# Patient Record
(demographics unavailable — no encounter records)

---

## 2024-10-14 NOTE — CARDIOLOGY SUMMARY
[de-identified] : Atrial fibrillation, rapid ventricular response [de-identified] : 1/2016- 24 hr holter -SVT/PAT, ?a fib 5/2016- 24 hr, SVT/PAT,  [de-identified] : hospitalized: 6/2024 CONCLUSIONS:  1. Left ventricular cavity is small. Left ventricular wall thickness is normal. Left ventricular systolic function is normal with an ejection fraction of 68 % by Torres's method of disks. There are no regional wall motion abnormalities seen.  2. Left ventricular global longitudinal strain is -20.1 % which is normal (< -18%). Images were acquired on a [employer] ultrasound system and processed using [employer] strain analysis software with a heart rate of 64 bpm and a blood pressure of 132/74 mmHg.  3. The left ventricular diastolic function is indeterminate, with elevated filling pressure.  4. Normal right ventricular cavity size, with normal wall thickness, and normal systolic function.  5. The left atrium is normal in size.  6. The right atrium is mildly dilated.  7. No pericardial effusion seen.  8. Estimated pulmonary artery systolic pressure is 37 mmHg.  9. Compared to the transthoracic echocardiogram performed on 4/12/2022, there have been no significant interval changes.

## 2024-10-14 NOTE — REASON FOR VISIT
[Arrhythmia/ECG Abnorrmalities] : arrhythmia/ECG abnormalities [Other: ____] : [unfilled] [Follow-Up - Clinic] : a clinic follow-up of [Chest Pain] : chest pain

## 2024-10-14 NOTE — PHYSICAL EXAM
[General Appearance - Well Developed] : well developed [Normal Appearance] : normal appearance [Well Groomed] : well groomed [General Appearance - Well Nourished] : well nourished [No Deformities] : no deformities [General Appearance - In No Acute Distress] : no acute distress [Normal Conjunctiva] : the conjunctiva exhibited no abnormalities [Eyelids - No Xanthelasma] : the eyelids demonstrated no xanthelasmas [Normal Oral Mucosa] : normal oral mucosa [No Oral Pallor] : no oral pallor [No Oral Cyanosis] : no oral cyanosis [Normal Jugular Venous A Waves Present] : normal jugular venous A waves present [Normal Jugular Venous V Waves Present] : normal jugular venous V waves present [No Jugular Venous Martins A Waves] : no jugular venous martins A waves [Respiration, Rhythm And Depth] : normal respiratory rhythm and effort [Exaggerated Use Of Accessory Muscles For Inspiration] : no accessory muscle use [Auscultation Breath Sounds / Voice Sounds] : lungs were clear to auscultation bilaterally [Abdomen Soft] : soft [Abdomen Tenderness] : non-tender [Abdomen Mass (___ Cm)] : no abdominal mass palpated [Nail Clubbing] : no clubbing of the fingernails [Cyanosis, Localized] : no localized cyanosis [Petechial Hemorrhages (___cm)] : no petechial hemorrhages [Skin Color & Pigmentation] : normal skin color and pigmentation [] : no rash [No Venous Stasis] : no venous stasis [Skin Lesions] : no skin lesions [No Skin Ulcers] : no skin ulcer [No Xanthoma] : no  xanthoma was observed [Oriented To Time, Place, And Person] : oriented to person, place, and time [Affect] : the affect was normal [Mood] : the mood was normal [No Anxiety] : not feeling anxious [Normal Rate] : normal [No Gallop] : no gallop heard [2+] : left 2+ [___ +] : bilateral [unfilled]U+ pretibial pitting edema [No Acute Distress] : no acute distress [No Carotid Bruit] : no carotid bruit [Rhythm Regular] : regular [Normal S1] : normal S1 [Normal S2] : normal S2 [No Murmur] : no murmurs heard [No Pitting Edema] : no pitting edema present [Rt] : varicose veins of the right leg noted [Lt] : varicose veins of the left leg noted [No Abnormalities] : the abdominal aorta was not enlarged and no bruit was heard [Clear Lung Fields] : clear lung fields [Good Air Entry] : good air entry [Soft] : abdomen soft [Non Tender] : non-tender [No Edema] : no edema [Moves all extremities] : moves all extremities [Memory Deficit] : memory deficit [FreeTextEntry1] : walks w walker [S3] : no S3 [S4] : no S4 [Right Femoral Bruit] : no bruit heard over the right femoral artery [Left Femoral Bruit] : no bruit heard over the left femoral artery [Right Carotid Bruit] : no bruit heard over the right carotid [Left Carotid Bruit] : no bruit heard over the left carotid [de-identified] : wheel chair primarily [de-identified] : 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

## 2024-10-14 NOTE — REVIEW OF SYSTEMS
[Speech Disturbance] : speech disturbance [Negative] : Heme/Lymph [de-identified] : see HPI [de-identified] : forgetful-short term memory

## 2024-10-14 NOTE — DISCUSSION/SUMMARY
[FreeTextEntry1] : Ms. Mendosa arrives today for follow up today having been feeling generally well.   She was discovered to have multiple strokes, and has more recently been discovered to have had paroxysmal rapid atrial fibrillation.  She is now anticoagulated.  She is minimally symptomatic from atrial fibrillation.  She certainly requires better rate control.  In sinus rhythm, her heart rate is about 60.  This may be difficult, can I suspect that the addition of a calcium channel blocker will be required, rather than increasing her dose of metoprolol.  I am hopeful that she will not require a permanent pacemaker.   Echocardiography performed during her recent hospitalization demonstrated a normal ejection fraction with normal biatrial size.  An extended Holter monitor was recently performed revealing up to 3 hours of atrial fibrillation.  She is going directly to the emergency department of [facility].  I explained that unless she reverts back to sinus rhythm quickly, she would likely be admitted.  She will require a follow-up evaluation in about a month. [EKG obtained to assist in diagnosis and management of assessed problem(s)] : EKG obtained to assist in diagnosis and management of assessed problem(s)

## 2024-10-14 NOTE — HISTORY OF PRESENT ILLNESS
[FreeTextEntry1] : Mallory presented to the office today for a cardiovascular evaluation.  She was last seen in the office 6 weeks ago.   She is now 88 years old, with a history of paroxysmal SVT, likely atrial tachycardia, as well as mild to moderate valvular heart disease.  There were initially some concerns in the past about the possibility of atrial fibrillation, but we did note episodes consistent with PAT when she was wearing a monitor.  She has done very well on Toprol-XL 50 mg daily.     Noted to have sciatica and sacroiliitis of b/l hip joints for which patient is on Duloxetine. She is sedentary and uses a walker or a wheelchair, though she has been trying to walk more, and having a modest degree of success.  She went to hospital for special surgery and had a comprehensive evaluation, and most recently has been using acupuncture.   She was evaluated in August, 2024, after having had a hospitalization at [facility] between June 19 and June 22, 2024.  She presented at that time with slurred speech and a right hemiparesis.  CT of the head revealed a right inferior cerebellar hemispheric abnormality felt to be an age-indeterminate infarct.  MRI revealed a region of distracted diffusion in the right superior and inferior cerebellar hemispheres corresponding to the decreased density on the CT scan, consistent with acute infarction.  There was an additional region in the parasagittal left anterior parietal lobe suggesting the presence of a tiny acute infarction.  The findings were suggestive of an embolic phenomenon.  Other abnormalities were noted as well.  An echocardiogram revealed a normal ejection fraction.  She was discharged to rehabilitation, and remained there for several weeks.  She saw me in the office at which time she was slowly improving.  It was noted that she had elevated liver function test, for which her statin had been held. Ultimately, we referred her for an extended Holter.  This revealed that she did have atrial fibrillation lasting up to 3 hours.  I requested that she stop aspirin, and start Eliquis, 2.5 mg twice daily.  She presents to the office today doing a bit better.  She remains with some significant limitations following her stroke. She did not have any sense of any irregularity in her heart rhythm until she was in the car on the way over this afternoon.

## 2024-12-23 NOTE — DISCUSSION/SUMMARY
[FreeTextEntry1] : Her gait was not tested, as she did not bring her walker. She is otherwise essentially neurologically intact.  To summarize, she presented to [facility] on 6/19/24 with aphasia due to an acute infarct in the left parietal cortex. She also had an acute right cerebellar infarct at the time, which was clinically silent. The mechanism of her infarcts is consistent with embolic stroke of undetermined source.  - She should have an implantable loop recorder to screen for occult AF. She is wearing an MCOT monitor; if negative, she will have an ILR. - She should benefit from aggressive vascular risk factor control. - She is taking Aspirin for secondary stroke prevention. - I have provided her with referrals for PT/OT/speech therapy/cognitive therapy.  12/23/24 -Overall she is neurologically stable. -Dopplers done today were unremarkable, with mild ICA stenosis bilaterally.  A complex left thyroid nodule was incidentally detected; I defer to you regarding whether this finding warrants further investigation. -Since her last visit, she was diagnosed with atrial fibrillation.  Her stroke in August was likely due to cardioembolism secondary to atrial fibrillation.  She should continue to take Eliquis for atrial fibrillation and secondary stroke prevention. -She should continue to benefit from aggressive vascular risk factor control. -She should continue to participate in regular PT/OT/speech therapy.  She can follow up in 6 months with repeat Dopplers. I hope she remains free of further serious trouble.

## 2024-12-23 NOTE — CONSULT LETTER
[Dear  ___] : Dear  [unfilled], [Consult Letter:] : I had the pleasure of evaluating your patient, [unfilled]. [Please see my note below.] : Please see my note below. [Consult Closing:] : Thank you very much for allowing me to participate in the care of this patient.  If you have any questions, please do not hesitate to contact me. [Sincerely,] : Sincerely, [FreeTextEntry2] :  Jose Carlos Jessica MD [FreeTextEntry3] : Ruth Beach, FNP-BC

## 2024-12-23 NOTE — HISTORY OF PRESENT ILLNESS
[FreeTextEntry1] : She is an 89 year old right handed lady.  HPI from [facility] 6/19/24:  88y (11-Nov-1935) F w/ PMHx significant for paroxysmal SVT, "fluid in the brain", b/l hip OA & b/l sciatica presents to the ED due to word finding difficulty. Patient was speaking on the phone at home when her home aid noted increased paucity with strange speech. Patient also noted a strange feeling in the right side of her face and RUE at the same time. The home aid states the symptoms waxed and waned in the day and the patient refused to come to the hospital. Patient went to sleep and woke this morning with improvement but not resolution of her symptoms. The sensory symptoms had resolved though word finding difficulty persists. Patient was still concerned about her speech and her family convinced her to come to the ED. CT head was without hemorrhage, mass effect or midline shift though revealed an area of hypoattenuation within the right inferior cerebellar hemisphere which may represent an age indeterminate infarct. CTA was without flow limiting stenosis, AVM or aneurysm.  Of note, patient has had chronic gait issues for the past 4 or so years. Patient's home aid states she has been using a walker for 4 or so years. This is reportedly related to sciatica and sacroiliitis of b/l hip joints for which patient is on Duloxetine. In addition, patient's daughter reports the patient has a history of "fluid in the brain" for which she was evaluated outpatient about 1.5 years ago and it was determined that there was no role for operative intervention.   Patient continues to express difficulty finding the right words. Patient denies headache, nausea, vomiting, dizziness, slurred speech, vision changes, hearing changes, focal numbness or focal weakness.  Workup at [facility] includes: - CTA H/N 6/19/24:  No large vessel occlusion or major stenosis. - MRI HEAD 06/20/24: Region of restricted diffusion within the right superior and inferior  cerebellar hemispheres corresponding to decreased density on the recent  brain CT consistent with acute infarction. Additional tiny linear region of restricted diffusion parasagittal left  anterior parietal lobe suggesting the presence of tiny acute infarction. Findings suggest the presence of embolic phenomenon. Findings suggesting the presence of mild to moderate normal pressure hydrocephalus.  TTE: Left ventricular cavity is small. Left ventricular wall thickness is normal. Left ventricular systolic function is normal with an ejection fraction of 68 %, The left atrium is normal in size.The right atrium is mildly dilated.  9/3/24 She presents to the office today with her daughter and home health attendant. She has made improvements since the stroke, but has residual difficulty with articulating thoughts and short term memory. She denies any new focal neurologic symptoms.  12/23/24 She presents to the office today with her daughter and home health attendant.  She still has some occasional dizziness and difficulty with articulation.  Since prior visit, she was diagnosed with atrial fibrillation and is now taking Eliquis.  She denies any new focal neurologic symptoms.  Carotid Doppler 12/23/2024: Mild, approximately 45% ICA stenosis bilaterally.  Incidental finding: Complex left thyroid nodule: 0.7 X 0.5 cm. TCD 12/23/2024.  Unable to insonate the left temporal window, due to poor acoustic windows.  The remainder of the [place] was within normal limits.  PCP Dr. Jose Carlos Jessica

## 2024-12-23 NOTE — PHYSICAL EXAM
[General Appearance - Alert] : alert [General Appearance - In No Acute Distress] : in no acute distress [Oriented To Time, Place, And Person] : oriented to person, place, and time [Impaired Insight] : insight and judgment were intact [Affect] : the affect was normal [Sclera] : the sclera and conjunctiva were normal [Extraocular Movements] : extraocular movements were intact [Outer Ear] : the ears and nose were normal in appearance [Examination Of The Oral Cavity] : the lips and gums were normal [Neck Appearance] : the appearance of the neck was normal [Auscultation Breath Sounds / Voice Sounds] : lungs were clear to auscultation bilaterally [Heart Sounds] : normal S1 and S2 [Motor Tone] : muscle strength and tone were normal [Skin Color & Pigmentation] : normal skin color and pigmentation [Skin Turgor] : normal skin turgor [] : no rash [FreeTextEntry1] :  NEUROLOGICAL EXAM: Mental status: Awake, alert, oriented x3. Speech fluent, repetition intact. No neglect, normal memory. Following commands. Some hesitancy with speech. Cranial Nerves: PERRL, EOMI. No facial asymmetry, no nystagmus, no dysarthria,  tongue midline Motor exam: Normal tone, no drift, 5/5 RUE, 5/5 RLE, 5/5 LUE, 5/5 LLE, fine finger movements are intact Sensation: Intact to light touch  Coordination/ Gait: No dysmetria, gait not assessed as she did not bring her walker

## 2025-01-13 NOTE — REASON FOR VISIT
[Arrhythmia/ECG Abnorrmalities] : arrhythmia/ECG abnormalities [Follow-Up - Clinic] : a clinic follow-up of [Chest Pain] : chest pain [Other: ____] : [unfilled]

## 2025-01-16 NOTE — CARDIOLOGY SUMMARY
[de-identified] : 1/13/25: Sinus onur  12/14/24: Atrial fibrillation, rapid ventricular response [de-identified] : 1/2016- 24 hr holter -SVT/PAT, ?a fib 5/2016- 24 hr, SVT/PAT,  [de-identified] : hospitalized: 6/2024 CONCLUSIONS:  1. Left ventricular cavity is small. Left ventricular wall thickness is normal. Left ventricular systolic function is normal with an ejection fraction of 68 % by Torres's method of disks. There are no regional wall motion abnormalities seen.  2. Left ventricular global longitudinal strain is -20.1 % which is normal (< -18%). Images were acquired on a [employer] ultrasound system and processed using [employer] strain analysis software with a heart rate of 64 bpm and a blood pressure of 132/74 mmHg.  3. The left ventricular diastolic function is indeterminate, with elevated filling pressure.  4. Normal right ventricular cavity size, with normal wall thickness, and normal systolic function.  5. The left atrium is normal in size.  6. The right atrium is mildly dilated.  7. No pericardial effusion seen.  8. Estimated pulmonary artery systolic pressure is 37 mmHg.  9. Compared to the transthoracic echocardiogram performed on 4/12/2022, there have been no significant interval changes.

## 2025-01-16 NOTE — PHYSICAL EXAM
[General Appearance - Well Developed] : well developed [Normal Appearance] : normal appearance [Well Groomed] : well groomed [General Appearance - Well Nourished] : well nourished [No Deformities] : no deformities [General Appearance - In No Acute Distress] : no acute distress [Eyelids - No Xanthelasma] : the eyelids demonstrated no xanthelasmas [Normal Oral Mucosa] : normal oral mucosa [No Oral Pallor] : no oral pallor [No Oral Cyanosis] : no oral cyanosis [Normal Jugular Venous A Waves Present] : normal jugular venous A waves present [Normal Jugular Venous V Waves Present] : normal jugular venous V waves present [No Jugular Venous Martins A Waves] : no jugular venous martins A waves [Respiration, Rhythm And Depth] : normal respiratory rhythm and effort [Exaggerated Use Of Accessory Muscles For Inspiration] : no accessory muscle use [Auscultation Breath Sounds / Voice Sounds] : lungs were clear to auscultation bilaterally [Abdomen Soft] : soft [Abdomen Tenderness] : non-tender [Abdomen Mass (___ Cm)] : no abdominal mass palpated [Nail Clubbing] : no clubbing of the fingernails [Cyanosis, Localized] : no localized cyanosis [Petechial Hemorrhages (___cm)] : no petechial hemorrhages [Skin Color & Pigmentation] : normal skin color and pigmentation [] : no rash [No Venous Stasis] : no venous stasis [Skin Lesions] : no skin lesions [No Skin Ulcers] : no skin ulcer [No Xanthoma] : no  xanthoma was observed [Oriented To Time, Place, And Person] : oriented to person, place, and time [Affect] : the affect was normal [Mood] : the mood was normal [No Anxiety] : not feeling anxious [Normal Rate] : normal [2+] : left 2+ [___ +] : bilateral [unfilled]U+ pretibial pitting edema [No Acute Distress] : no acute distress [No Carotid Bruit] : no carotid bruit [Rhythm Regular] : regular [Normal S1] : normal S1 [Normal S2] : normal S2 [No Pitting Edema] : no pitting edema present [Rt] : varicose veins of the right leg noted [Lt] : varicose veins of the left leg noted [No Abnormalities] : the abdominal aorta was not enlarged and no bruit was heard [Clear Lung Fields] : clear lung fields [Good Air Entry] : good air entry [Soft] : abdomen soft [Non Tender] : non-tender [No Edema] : no edema [Moves all extremities] : moves all extremities [Memory Deficit] : memory deficit [Well Developed] : well developed [Well Nourished] : well nourished [Normal Conjunctiva] : normal conjunctiva [Normal Venous Pressure] : normal venous pressure [Normal S1, S2] : normal S1, S2 [No Murmur] : no murmur [No Rub] : no rub [No Gallop] : no gallop [Bradycardia] : bradycardic [No Respiratory Distress] : no respiratory distress  [No Masses/organomegaly] : no masses/organomegaly [Normal Gait] : normal gait [FreeTextEntry1] : walks w walker [S3] : no S3 [S4] : no S4 [Left Femoral Bruit] : no bruit heard over the left femoral artery [Right Femoral Bruit] : no bruit heard over the right femoral artery [Right Carotid Bruit] : no bruit heard over the right carotid [Left Carotid Bruit] : no bruit heard over the left carotid [de-identified] : wheel chair primarily [de-identified] : 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

## 2025-01-16 NOTE — PHYSICAL EXAM
[General Appearance - Well Developed] : well developed [Normal Appearance] : normal appearance [Well Groomed] : well groomed [General Appearance - Well Nourished] : well nourished [No Deformities] : no deformities [General Appearance - In No Acute Distress] : no acute distress [Eyelids - No Xanthelasma] : the eyelids demonstrated no xanthelasmas [Normal Oral Mucosa] : normal oral mucosa [No Oral Pallor] : no oral pallor [No Oral Cyanosis] : no oral cyanosis [Normal Jugular Venous A Waves Present] : normal jugular venous A waves present [Normal Jugular Venous V Waves Present] : normal jugular venous V waves present [No Jugular Venous Martins A Waves] : no jugular venous martins A waves [Respiration, Rhythm And Depth] : normal respiratory rhythm and effort [Exaggerated Use Of Accessory Muscles For Inspiration] : no accessory muscle use [Auscultation Breath Sounds / Voice Sounds] : lungs were clear to auscultation bilaterally [Abdomen Soft] : soft [Abdomen Tenderness] : non-tender [Abdomen Mass (___ Cm)] : no abdominal mass palpated [Nail Clubbing] : no clubbing of the fingernails [Cyanosis, Localized] : no localized cyanosis [Petechial Hemorrhages (___cm)] : no petechial hemorrhages [Skin Color & Pigmentation] : normal skin color and pigmentation [] : no rash [No Venous Stasis] : no venous stasis [Skin Lesions] : no skin lesions [No Skin Ulcers] : no skin ulcer [No Xanthoma] : no  xanthoma was observed [Oriented To Time, Place, And Person] : oriented to person, place, and time [Affect] : the affect was normal [Mood] : the mood was normal [No Anxiety] : not feeling anxious [Normal Rate] : normal [2+] : left 2+ [___ +] : bilateral [unfilled]U+ pretibial pitting edema [No Acute Distress] : no acute distress [No Carotid Bruit] : no carotid bruit [Rhythm Regular] : regular [Normal S1] : normal S1 [Normal S2] : normal S2 [No Pitting Edema] : no pitting edema present [Rt] : varicose veins of the right leg noted [Lt] : varicose veins of the left leg noted [No Abnormalities] : the abdominal aorta was not enlarged and no bruit was heard [Clear Lung Fields] : clear lung fields [Good Air Entry] : good air entry [Soft] : abdomen soft [Non Tender] : non-tender [No Edema] : no edema [Moves all extremities] : moves all extremities [Memory Deficit] : memory deficit [Well Developed] : well developed [Well Nourished] : well nourished [Normal Conjunctiva] : normal conjunctiva [Normal Venous Pressure] : normal venous pressure [Normal S1, S2] : normal S1, S2 [No Murmur] : no murmur [No Rub] : no rub [No Gallop] : no gallop [Bradycardia] : bradycardic [No Respiratory Distress] : no respiratory distress  [No Masses/organomegaly] : no masses/organomegaly [Normal Gait] : normal gait [FreeTextEntry1] : walks w walker [S3] : no S3 [S4] : no S4 [Right Femoral Bruit] : no bruit heard over the right femoral artery [Left Femoral Bruit] : no bruit heard over the left femoral artery [Right Carotid Bruit] : no bruit heard over the right carotid [Left Carotid Bruit] : no bruit heard over the left carotid [de-identified] : wheel chair primarily [de-identified] : 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

## 2025-01-16 NOTE — REVIEW OF SYSTEMS
[Speech Disturbance] : speech disturbance [Negative] : Psychiatric [Dizziness] : no dizziness [Tremor] : no tremor was seen [Numbness (Hypoesthesia)] : no numbness [Convulsions] : no convulsions [Tingling (Paresthesia)] : no tingling [Weakness] : no weakness [Limb Weakness (Paresis)] : no limb weakness (Paresis) [de-identified] : see HPI [de-identified] : forgetful-short term memory

## 2025-01-16 NOTE — ADDENDUM
[FreeTextEntry1] : seen in oct 2024 with rapid af admitted for management, loaded with amio and dc the next day metop was decreased has remained on toprol 50 and amio 200, and eliquis, since doing well, in sr remains with some neuro sxs, stable can decr amio to 100

## 2025-01-16 NOTE — HISTORY OF PRESENT ILLNESS
[FreeTextEntry1] : Mrs. Mallory Mendosa presented to the office today for a cardiovascular evaluation.  She was last seen in the office 3 months ago.   She is now 89 years old, with a history of paroxysmal SVT, likely atrial tachycardia, as well as mild to moderate valvular heart disease and paroxysmal atrial fibrillation currently on Eliquis 2.5mg BID.  In the past, there were initially some concerns in the past about the possibility of atrial fibrillation, but did note episodes consistent with PAT when she was wearing a monitor in 9/2024.  She has done very well on Toprol-XL 50 mg daily.     Noted to have sciatica and sacroiliitis of b/l hip joints for which patient is on Duloxetine. She is sedentary and uses a walker or a wheelchair, though she has been trying to walk more, and having a modest degree of success.  She went to hospital for special surgery and had a comprehensive evaluation, and most recently has been using acupuncture.   She was evaluated in August, 2024, after having had a hospitalization at [facility] between June 19 and June 22, 2024.  She presented at that time with slurred speech and a right hemiparesis.  CT of the head revealed a right inferior cerebellar hemispheric abnormality felt to be an age-indeterminate infarct.  MRI revealed a region of distracted diffusion in the right superior and inferior cerebellar hemispheres corresponding to the decreased density on the CT scan, consistent with acute infarction.  There was an additional region in the parasagittal left anterior parietal lobe suggesting the presence of a tiny acute infarction.  The findings were suggestive of an embolic phenomenon.  Other abnormalities were noted as well.  An echocardiogram revealed a normal ejection fraction.  She was discharged to rehabilitation, and remained there for several weeks.  She saw me in the office at which time she was slowly improving.  It was noted that she had elevated liver function test, for which her statin had been held. Ultimately, we referred her for an extended Holter.  This revealed that she did have atrial fibrillation lasting up to 3 hours.  Aspirin 81mg was discontinued and she was started on Eliquis 2.5mg twice daily.    She presents to the office today with her daughter Jeff Tee.  Since her last office visit on 10/14/2024, she presented to the office in AFib with RVR.  She was advised to go to hospital.  She did not have any sense of any irregularity in her heart rhythm until she was in the car on the way over this afternoon.  She  presented to [facility] on 10/14/24 in AFib with RVR.  She was admitted and started on Amiodarone by Cardiology with improvement. She was previously on Metoprolol Succinate 100mg daily, she was discharged on Amiodarone 200mg 2 tabs PO BID for 5 days and then decreased to 200mg daily, Metoprolol Succinate 50mg daily and Eliquis 5mg BID. She is doing better from a cardiovascular stand point.  She remains with some significant limitations following her stroke. She denies any chest pain, chest pressure, palpitations, PND, orthopnea, lower extremity edema, near syncope, syncope or stroke like symptoms.    Medication reconciliation was performed.  Laureen daughter Kelly is uncertain of her mother's Metoprolol Succinate dosing.  She will call the office to confirm the dosing once she checks the bottle.

## 2025-01-16 NOTE — HISTORY OF PRESENT ILLNESS
[FreeTextEntry1] : Mrs. Mallory Mendosa presented to the office today for a cardiovascular evaluation.  She was last seen in the office 3 months ago.   She is now 89 years old, with a history of paroxysmal SVT, likely atrial tachycardia, as well as mild to moderate valvular heart disease and paroxysmal atrial fibrillation currently on Eliquis 2.5mg BID.  In the past, there were initially some concerns in the past about the possibility of atrial fibrillation, but did note episodes consistent with PAT when she was wearing a monitor in 9/2024.  She has done very well on Toprol-XL 50 mg daily.     Noted to have sciatica and sacroiliitis of b/l hip joints for which patient is on Duloxetine. She is sedentary and uses a walker or a wheelchair, though she has been trying to walk more, and having a modest degree of success.  She went to hospital for special surgery and had a comprehensive evaluation, and most recently has been using acupuncture.   She was evaluated in August, 2024, after having had a hospitalization at [facility] between June 19 and June 22, 2024.  She presented at that time with slurred speech and a right hemiparesis.  CT of the head revealed a right inferior cerebellar hemispheric abnormality felt to be an age-indeterminate infarct.  MRI revealed a region of distracted diffusion in the right superior and inferior cerebellar hemispheres corresponding to the decreased density on the CT scan, consistent with acute infarction.  There was an additional region in the parasagittal left anterior parietal lobe suggesting the presence of a tiny acute infarction.  The findings were suggestive of an embolic phenomenon.  Other abnormalities were noted as well.  An echocardiogram revealed a normal ejection fraction.  She was discharged to rehabilitation, and remained there for several weeks.  She saw me in the office at which time she was slowly improving.  It was noted that she had elevated liver function test, for which her statin had been held. Ultimately, we referred her for an extended Holter.  This revealed that she did have atrial fibrillation lasting up to 3 hours.  Aspirin 81mg was discontinued and she was started on Eliquis 2.5mg twice daily.    She presents to the office today with her daughter Jeff Tee.  Since her last office visit on 10/14/2024, she presented to the office in AFib with RVR.  She was advised to go to hospital.  She did not have any sense of any irregularity in her heart rhythm until she was in the car on the way over this afternoon.  She  presented to [facility] on 10/14/24 in AFib with RVR.  She was admitted and started on Amiodarone by Cardiology with improvement. She was previously on Metoprolol Succinate 100mg daily, she was discharged on Amiodarone 200mg 2 tabs PO BID for 5 days and then decreased to 200mg daily, Metoprolol Succinate 50mg daily and Eliquis 5mg BID. She is doing better from a cardiovascular stand point.  She remains with some significant limitations following her stroke. She denies any chest pain, chest pressure, palpitations, PND, orthopnea, lower extremity edema, near syncope, syncope or stroke like symptoms.    Medication reconciliation was performed.  Laureen daughter Kelly is uncertain of her mother's Metoprolol Succinate dosing.  She will call the office to confirm the dosing once she checks the bottle.

## 2025-01-16 NOTE — REVIEW OF SYSTEMS
[Speech Disturbance] : speech disturbance [Negative] : Psychiatric [Dizziness] : no dizziness [Tremor] : no tremor was seen [Numbness (Hypoesthesia)] : no numbness [Convulsions] : no convulsions [Tingling (Paresthesia)] : no tingling [Weakness] : no weakness [Limb Weakness (Paresis)] : no limb weakness (Paresis) [de-identified] : see HPI [de-identified] : forgetful-short term memory

## 2025-01-16 NOTE — DISCUSSION/SUMMARY
[EKG obtained to assist in diagnosis and management of assessed problem(s)] : EKG obtained to assist in diagnosis and management of assessed problem(s) [FreeTextEntry1] : Ms. Mendosa arrives today for follow up feeling generally well.   She was discovered to have multiple strokes, and has more recently been discovered to have had paroxysmal rapid atrial fibrillation.  She is now anti-coagulated with Eliquis despite being in sinus rhythm.    She is in no acute distress.  She is clinically euvolemic on exam.  ECG illustrates sinus bradycardia without obvious ischemia.  Blood pressure is controlled.    She will continue Metoprolol Succinate 50mg daily for rate control per hospital discharge recommendations.  Daughter unsure of Metoprolol Succinate dosing.  She will call the office and confirm dosage once she arrives home.  She will decrease Amiodarone from 200mg daily to 100mg daily for rhythm control.    She will continue Eliquis 2.5mg twice a day for prevention of stroke and or thromboembolism.   She will follow up in 3 months or sooner.

## 2025-01-16 NOTE — PHYSICAL EXAM
[General Appearance - Well Developed] : well developed [Normal Appearance] : normal appearance [Well Groomed] : well groomed [General Appearance - Well Nourished] : well nourished [No Deformities] : no deformities [General Appearance - In No Acute Distress] : no acute distress [Eyelids - No Xanthelasma] : the eyelids demonstrated no xanthelasmas [Normal Oral Mucosa] : normal oral mucosa [No Oral Pallor] : no oral pallor [No Oral Cyanosis] : no oral cyanosis [Normal Jugular Venous A Waves Present] : normal jugular venous A waves present [Normal Jugular Venous V Waves Present] : normal jugular venous V waves present [No Jugular Venous Martins A Waves] : no jugular venous martins A waves [Respiration, Rhythm And Depth] : normal respiratory rhythm and effort [Exaggerated Use Of Accessory Muscles For Inspiration] : no accessory muscle use [Auscultation Breath Sounds / Voice Sounds] : lungs were clear to auscultation bilaterally [Abdomen Soft] : soft [Abdomen Tenderness] : non-tender [Abdomen Mass (___ Cm)] : no abdominal mass palpated [Nail Clubbing] : no clubbing of the fingernails [Cyanosis, Localized] : no localized cyanosis [Petechial Hemorrhages (___cm)] : no petechial hemorrhages [Skin Color & Pigmentation] : normal skin color and pigmentation [] : no rash [No Venous Stasis] : no venous stasis [Skin Lesions] : no skin lesions [No Skin Ulcers] : no skin ulcer [No Xanthoma] : no  xanthoma was observed [Oriented To Time, Place, And Person] : oriented to person, place, and time [Affect] : the affect was normal [Mood] : the mood was normal [No Anxiety] : not feeling anxious [Normal Rate] : normal [2+] : left 2+ [___ +] : bilateral [unfilled]U+ pretibial pitting edema [No Acute Distress] : no acute distress [No Carotid Bruit] : no carotid bruit [Rhythm Regular] : regular [Normal S1] : normal S1 [Normal S2] : normal S2 [No Pitting Edema] : no pitting edema present [Rt] : varicose veins of the right leg noted [Lt] : varicose veins of the left leg noted [No Abnormalities] : the abdominal aorta was not enlarged and no bruit was heard [Clear Lung Fields] : clear lung fields [Good Air Entry] : good air entry [Soft] : abdomen soft [Non Tender] : non-tender [No Edema] : no edema [Moves all extremities] : moves all extremities [Memory Deficit] : memory deficit [Well Developed] : well developed [Well Nourished] : well nourished [Normal Conjunctiva] : normal conjunctiva [Normal Venous Pressure] : normal venous pressure [Normal S1, S2] : normal S1, S2 [No Murmur] : no murmur [No Rub] : no rub [No Gallop] : no gallop [Bradycardia] : bradycardic [No Respiratory Distress] : no respiratory distress  [No Masses/organomegaly] : no masses/organomegaly [Normal Gait] : normal gait [FreeTextEntry1] : walks w walker [S3] : no S3 [S4] : no S4 [Left Femoral Bruit] : no bruit heard over the left femoral artery [Right Femoral Bruit] : no bruit heard over the right femoral artery [Right Carotid Bruit] : no bruit heard over the right carotid [Left Carotid Bruit] : no bruit heard over the left carotid [de-identified] : wheel chair primarily [de-identified] : 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

## 2025-01-16 NOTE — REVIEW OF SYSTEMS
[Speech Disturbance] : speech disturbance [Negative] : Psychiatric [Dizziness] : no dizziness [Tremor] : no tremor was seen [Numbness (Hypoesthesia)] : no numbness [Convulsions] : no convulsions [Tingling (Paresthesia)] : no tingling [Weakness] : no weakness [Limb Weakness (Paresis)] : no limb weakness (Paresis) [de-identified] : see HPI [de-identified] : forgetful-short term memory

## 2025-01-16 NOTE — CARDIOLOGY SUMMARY
[de-identified] : 1/13/25: Sinus onur  12/14/24: Atrial fibrillation, rapid ventricular response [de-identified] : 1/2016- 24 hr holter -SVT/PAT, ?a fib 5/2016- 24 hr, SVT/PAT,  [de-identified] : hospitalized: 6/2024 CONCLUSIONS:  1. Left ventricular cavity is small. Left ventricular wall thickness is normal. Left ventricular systolic function is normal with an ejection fraction of 68 % by Torres's method of disks. There are no regional wall motion abnormalities seen.  2. Left ventricular global longitudinal strain is -20.1 % which is normal (< -18%). Images were acquired on a [employer] ultrasound system and processed using [employer] strain analysis software with a heart rate of 64 bpm and a blood pressure of 132/74 mmHg.  3. The left ventricular diastolic function is indeterminate, with elevated filling pressure.  4. Normal right ventricular cavity size, with normal wall thickness, and normal systolic function.  5. The left atrium is normal in size.  6. The right atrium is mildly dilated.  7. No pericardial effusion seen.  8. Estimated pulmonary artery systolic pressure is 37 mmHg.  9. Compared to the transthoracic echocardiogram performed on 4/12/2022, there have been no significant interval changes.

## 2025-01-16 NOTE — CARDIOLOGY SUMMARY
[de-identified] : 1/13/25: Sinus onur  12/14/24: Atrial fibrillation, rapid ventricular response [de-identified] : 1/2016- 24 hr holter -SVT/PAT, ?a fib 5/2016- 24 hr, SVT/PAT,  [de-identified] : hospitalized: 6/2024 CONCLUSIONS:  1. Left ventricular cavity is small. Left ventricular wall thickness is normal. Left ventricular systolic function is normal with an ejection fraction of 68 % by Torres's method of disks. There are no regional wall motion abnormalities seen.  2. Left ventricular global longitudinal strain is -20.1 % which is normal (< -18%). Images were acquired on a [employer] ultrasound system and processed using [email or web address] strain analysis software with a heart rate of 64 bpm and a blood pressure of 132/74 mmHg.  3. The left ventricular diastolic function is indeterminate, with elevated filling pressure.  4. Normal right ventricular cavity size, with normal wall thickness, and normal systolic function.  5. The left atrium is normal in size.  6. The right atrium is mildly dilated.  7. No pericardial effusion seen.  8. Estimated pulmonary artery systolic pressure is 37 mmHg.  9. Compared to the transthoracic echocardiogram performed on 4/12/2022, there have been no significant interval changes.

## 2025-01-16 NOTE — HISTORY OF PRESENT ILLNESS
[FreeTextEntry1] : Mrs. Mallory Mendosa presented to the office today for a cardiovascular evaluation.  She was last seen in the office 3 months ago.   She is now 89 years old, with a history of paroxysmal SVT, likely atrial tachycardia, as well as mild to moderate valvular heart disease and paroxysmal atrial fibrillation currently on Eliquis 2.5mg BID.  In the past, there were initially some concerns in the past about the possibility of atrial fibrillation, but did note episodes consistent with PAT when she was wearing a monitor in 9/2024.  She has done very well on Toprol-XL 50 mg daily.     Noted to have sciatica and sacroiliitis of b/l hip joints for which patient is on Duloxetine. She is sedentary and uses a walker or a wheelchair, though she has been trying to walk more, and having a modest degree of success.  She went to hospital for special surgery and had a comprehensive evaluation, and most recently has been using acupuncture.   She was evaluated in August, 2024, after having had a hospitalization at [facility] between June 19 and June 22, 2024.  She presented at that time with slurred speech and a right hemiparesis.  CT of the head revealed a right inferior cerebellar hemispheric abnormality felt to be an age-indeterminate infarct.  MRI revealed a region of distracted diffusion in the right superior and inferior cerebellar hemispheres corresponding to the decreased density on the CT scan, consistent with acute infarction.  There was an additional region in the parasagittal left anterior parietal lobe suggesting the presence of a tiny acute infarction.  The findings were suggestive of an embolic phenomenon.  Other abnormalities were noted as well.  An echocardiogram revealed a normal ejection fraction.  She was discharged to rehabilitation, and remained there for several weeks.  She saw me in the office at which time she was slowly improving.  It was noted that she had elevated liver function test, for which her statin had been held. Ultimately, we referred her for an extended Holter.  This revealed that she did have atrial fibrillation lasting up to 3 hours.  Aspirin 81mg was discontinued and she was started on Eliquis 2.5mg twice daily.    She presents to the office today with her daughter Jeff Tee.  Since her last office visit on 10/14/2024, she presented to the office in AFib with RVR.  She was advised to go to hospital.  She did not have any sense of any irregularity in her heart rhythm until she was in the car on the way over this afternoon.  She  presented to [facility] on 10/14/24 in AFib with RVR.  She was admitted and started on Amiodarone by Cardiology with improvement. She was previously on Metoprolol Succinate 100mg daily, she was discharged on Amiodarone 200mg 2 tabs PO BID for 5 days and then decreased to 200mg daily, Metoprolol Succinate 50mg daily and Eliquis 5mg BID. She is doing better from a cardiovascular stand point.  She remains with some significant limitations following her stroke. She denies any chest pain, chest pressure, palpitations, PND, orthopnea, lower extremity edema, near syncope, syncope or stroke like symptoms.    Medication reconciliation was performed.  Laureen daughter Kelly is uncertain of her mother's Metoprolol Succinate dosing.  She will call the office to confirm the dosing once she checks the bottle.

## 2025-05-19 NOTE — REVIEW OF SYSTEMS
[Speech Disturbance] : speech disturbance [Negative] : Heme/Lymph [Dizziness] : no dizziness [Tremor] : no tremor was seen [Numbness (Hypoesthesia)] : no numbness [Convulsions] : no convulsions [Tingling (Paresthesia)] : no tingling [Weakness] : no weakness [Limb Weakness (Paresis)] : no limb weakness (Paresis) [de-identified] : see HPI [de-identified] : forgetful-short term memory

## 2025-05-19 NOTE — PHYSICAL EXAM
[General Appearance - Well Developed] : well developed [Normal Appearance] : normal appearance [Well Groomed] : well groomed [General Appearance - Well Nourished] : well nourished [No Deformities] : no deformities [General Appearance - In No Acute Distress] : no acute distress [Eyelids - No Xanthelasma] : the eyelids demonstrated no xanthelasmas [Normal Oral Mucosa] : normal oral mucosa [No Oral Pallor] : no oral pallor [No Oral Cyanosis] : no oral cyanosis [Normal Jugular Venous A Waves Present] : normal jugular venous A waves present [Normal Jugular Venous V Waves Present] : normal jugular venous V waves present [No Jugular Venous Martins A Waves] : no jugular venous martins A waves [Respiration, Rhythm And Depth] : normal respiratory rhythm and effort [Exaggerated Use Of Accessory Muscles For Inspiration] : no accessory muscle use [Auscultation Breath Sounds / Voice Sounds] : lungs were clear to auscultation bilaterally [Abdomen Soft] : soft [Abdomen Tenderness] : non-tender [Abdomen Mass (___ Cm)] : no abdominal mass palpated [Cyanosis, Localized] : no localized cyanosis [Nail Clubbing] : no clubbing of the fingernails [Petechial Hemorrhages (___cm)] : no petechial hemorrhages [Skin Color & Pigmentation] : normal skin color and pigmentation [] : no rash [No Venous Stasis] : no venous stasis [Skin Lesions] : no skin lesions [No Skin Ulcers] : no skin ulcer [No Xanthoma] : no  xanthoma was observed [Oriented To Time, Place, And Person] : oriented to person, place, and time [Mood] : the mood was normal [Affect] : the affect was normal [No Anxiety] : not feeling anxious [Normal Rate] : normal [2+] : left 2+ [___ +] : bilateral [unfilled]U+ pretibial pitting edema [Well Developed] : well developed [Well Nourished] : well nourished [No Acute Distress] : no acute distress [Normal Conjunctiva] : normal conjunctiva [Normal Venous Pressure] : normal venous pressure [No Carotid Bruit] : no carotid bruit [Normal S1, S2] : normal S1, S2 [No Rub] : no rub [No Gallop] : no gallop [Bradycardia] : bradycardic [Rhythm Regular] : regular [Normal S1] : normal S1 [Normal S2] : normal S2 [No Murmur] : no murmurs heard [No Pitting Edema] : no pitting edema present [Rt] : varicose veins of the right leg noted [Lt] : varicose veins of the left leg noted [No Abnormalities] : the abdominal aorta was not enlarged and no bruit was heard [Clear Lung Fields] : clear lung fields [Good Air Entry] : good air entry [No Respiratory Distress] : no respiratory distress  [Soft] : abdomen soft [Non Tender] : non-tender [No Masses/organomegaly] : no masses/organomegaly [Normal Gait] : normal gait [No Edema] : no edema [Moves all extremities] : moves all extremities [Memory Deficit] : memory deficit [FreeTextEntry1] : walks w walker [S3] : no S3 [S4] : no S4 [Right Femoral Bruit] : no bruit heard over the right femoral artery [Left Femoral Bruit] : no bruit heard over the left femoral artery [Right Carotid Bruit] : no bruit heard over the right carotid [Left Carotid Bruit] : no bruit heard over the left carotid [de-identified] : wheel chair primarily [de-identified] : 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

## 2025-05-19 NOTE — CARDIOLOGY SUMMARY
[de-identified] : 1/13/25: Sinus onur  12/14/24: Atrial fibrillation, rapid ventricular response May 19, 2025 sr [de-identified] : 1/2016- 24 hr holter -SVT/PAT, ?a fib 5/2016- 24 hr, SVT/PAT,  [de-identified] : hospitalized: 6/2024 CONCLUSIONS:  1. Left ventricular cavity is small. Left ventricular wall thickness is normal. Left ventricular systolic function is normal with an ejection fraction of 68 % by Torres's method of disks. There are no regional wall motion abnormalities seen.  2. Left ventricular global longitudinal strain is -20.1 % which is normal (< -18%). Images were acquired on a [employer] ultrasound system and processed using [employer] strain analysis software with a heart rate of 64 bpm and a blood pressure of 132/74 mmHg.  3. The left ventricular diastolic function is indeterminate, with elevated filling pressure.  4. Normal right ventricular cavity size, with normal wall thickness, and normal systolic function.  5. The left atrium is normal in size.  6. The right atrium is mildly dilated.  7. No pericardial effusion seen.  8. Estimated pulmonary artery systolic pressure is 37 mmHg.  9. Compared to the transthoracic echocardiogram performed on 4/12/2022, there have been no significant interval changes.

## 2025-05-19 NOTE — DISCUSSION/SUMMARY
[FreeTextEntry1] : Ms. Mendosa arrives today for follow up feeling generally well.   She was discovered to have multiple strokes, and has more recently been discovered to have had paroxysmal rapid atrial fibrillation.  She is now anti-coagulated with Eliquis.  She is in no acute distress.  She is clinically euvolemic on exam.  ECG illustrates sinus bradycardia without obvious ischemia.  Blood pressure is controlled.    She will continue Metoprolol Succinate 100mg daily for rate control per hospital discharge recommendations.     She will continue Amiodarone from 100mg daily.  She will continue Eliquis 2.5mg twice a day for prevention of stroke and or thromboembolism.   Given this episode of dyspnea, she will schedule a follow-up echocardiogram.  I mentioned that it is possible that she had an episode of atrial fibrillation or something similar, and that should she have recurrent symptoms, it would be useful to take her heart rate and blood pressure, and see what things are.  She will follow up in 3 months or sooner.   [EKG obtained to assist in diagnosis and management of assessed problem(s)] : EKG obtained to assist in diagnosis and management of assessed problem(s)

## 2025-05-19 NOTE — HISTORY OF PRESENT ILLNESS
[FreeTextEntry1] : Mrs. Mallory Mendosa presented to the office today for a cardiovascular evaluation.  She was last seen in the office 5 months ago.  She was more recently hospitalized and was in rehab.  She is now 89 years old, with a history of paroxysmal SVT, likely atrial tachycardia, as well as mild to moderate valvular heart disease and paroxysmal atrial fibrillation currently on Eliquis 2.5mg BID.  In the past, there were initially some concerns in the past about the possibility of atrial fibrillation, but did note episodes consistent with PAT when she was wearing a monitor in 9/2024.  She has done very well on Toprol-XL 50 mg daily.     Noted to have sciatica and sacroiliitis of b/l hip joints for which patient is on Duloxetine. She is sedentary and uses a walker or a wheelchair, though she has been trying to walk more, and having a modest degree of success.  She went to hospital for special surgery and had a comprehensive evaluation, and most recently has been using acupuncture.   She was evaluated in August, 2024, after having had a hospitalization at [facility] between June 19 and June 22, 2024.  She presented at that time with slurred speech and a right hemiparesis.  CT of the head revealed a right inferior cerebellar hemispheric abnormality felt to be an age-indeterminate infarct.  MRI revealed a region of distracted diffusion in the right superior and inferior cerebellar hemispheres corresponding to the decreased density on the CT scan, consistent with acute infarction.  There was an additional region in the parasagittal left anterior parietal lobe suggesting the presence of a tiny acute infarction.  The findings were suggestive of an embolic phenomenon.  Other abnormalities were noted as well.  An echocardiogram revealed a normal ejection fraction.  She was discharged to rehabilitation, and remained there for several weeks.  She saw me in the office at which time she was slowly improving.  It was noted that she had elevated liver function test, for which her statin had been held. Ultimately, we referred her for an extended Holter.  This revealed that she did have atrial fibrillation lasting up to 3 hours.  Aspirin 81mg was discontinued and she was started on Eliquis 2.5mg twice daily.  She was evaluated in January, 2025 at which time we reviewed her hospitalization in October 2024 with atrial fibrillation.  She had been on amiodarone and Eliquis at that time.  Unfortunately she was readmitted between March 8 and March 11, 2025 with a fall.  She was given medication for pain control, and sent to rehab.  She presents to the office today with her daughter Kelly and Tiki Tee.   She is doing better from a cardiovascular stand point.  She remains with some significant limitations following her stroke.  She has not had any clear symptoms suggestive of angina.  At 1 point when she was walking to her bedroom about a week ago, she had some significant fatigue, weakness and a degree of dyspnea.  She was put to bed, and recovered quickly.  This has not recurred.  Medication reconciliation was performed.  Laureen daughter Kelly is uncertain of her mother's Metoprolol Succinate dosing.  She will call the office to confirm the dosing once she checks the bottle.